# Patient Record
Sex: FEMALE | Race: WHITE | NOT HISPANIC OR LATINO | Employment: FULL TIME | URBAN - METROPOLITAN AREA
[De-identification: names, ages, dates, MRNs, and addresses within clinical notes are randomized per-mention and may not be internally consistent; named-entity substitution may affect disease eponyms.]

---

## 2017-02-20 ENCOUNTER — CONVERSION ENCOUNTER (OUTPATIENT)
Dept: GENERAL RADIOLOGY | Facility: HOSPITAL | Age: 52
End: 2017-02-20

## 2017-03-02 ENCOUNTER — CONVERSION ENCOUNTER (OUTPATIENT)
Dept: GENERAL RADIOLOGY | Facility: HOSPITAL | Age: 52
End: 2017-03-02

## 2018-02-09 ENCOUNTER — OFFICE VISIT CONVERTED (OUTPATIENT)
Dept: FAMILY MEDICINE CLINIC | Facility: CLINIC | Age: 53
End: 2018-02-09
Attending: PHYSICIAN ASSISTANT

## 2019-01-21 ENCOUNTER — HOSPITAL ENCOUNTER (OUTPATIENT)
Dept: GENERAL RADIOLOGY | Facility: HOSPITAL | Age: 54
Discharge: HOME | End: 2019-01-21
Attending: NURSE PRACTITIONER

## 2019-09-04 ENCOUNTER — OFFICE VISIT CONVERTED (OUTPATIENT)
Dept: FAMILY MEDICINE CLINIC | Facility: CLINIC | Age: 54
End: 2019-09-04
Attending: NURSE PRACTITIONER

## 2019-09-04 ENCOUNTER — HOSPITAL ENCOUNTER (OUTPATIENT)
Dept: FAMILY MEDICINE CLINIC | Facility: CLINIC | Age: 54
Discharge: HOME | End: 2019-09-04
Attending: NURSE PRACTITIONER

## 2019-09-07 LAB
AMOXICILLIN+CLAV SUSC ISLT: >=32
AMPICILLIN SUSC ISLT: >=32
AMPICILLIN+SULBAC SUSC ISLT: 16
BACTERIA UR CULT: ABNORMAL
CEFAZOLIN SUSC ISLT: 8
CEFEPIME SUSC ISLT: <=1
CEFTAZIDIME SUSC ISLT: <=1
CEFTRIAXONE SUSC ISLT: <=1
CEFUROXIME ORAL SUSC ISLT: 16
CEFUROXIME PARENTER SUSC ISLT: 16
CIPROFLOXACIN SUSC ISLT: <=0.25
ERTAPENEM SUSC ISLT: <=0.5
GENTAMICIN SUSC ISLT: <=1
LEVOFLOXACIN SUSC ISLT: <=0.12
NITROFURANTOIN SUSC ISLT: <=16
TETRACYCLINE SUSC ISLT: <=1
TMP SMX SUSC ISLT: <=20
TOBRAMYCIN SUSC ISLT: <=1

## 2019-09-24 ENCOUNTER — OFFICE VISIT CONVERTED (OUTPATIENT)
Dept: SURGERY | Facility: CLINIC | Age: 54
End: 2019-09-24
Attending: NURSE PRACTITIONER

## 2019-09-26 ENCOUNTER — OFFICE VISIT CONVERTED (OUTPATIENT)
Dept: FAMILY MEDICINE CLINIC | Facility: CLINIC | Age: 54
End: 2019-09-26
Attending: NURSE PRACTITIONER

## 2020-01-15 ENCOUNTER — HOSPITAL ENCOUNTER (OUTPATIENT)
Dept: GASTROENTEROLOGY | Facility: HOSPITAL | Age: 55
Setting detail: HOSPITAL OUTPATIENT SURGERY
Discharge: HOME | End: 2020-01-15
Attending: SURGERY

## 2020-01-15 LAB — HCG UR QL: NEGATIVE

## 2020-11-23 ENCOUNTER — TELEMEDICINE CONVERTED (OUTPATIENT)
Dept: FAMILY MEDICINE CLINIC | Facility: CLINIC | Age: 55
End: 2020-11-23
Attending: NURSE PRACTITIONER

## 2021-01-08 ENCOUNTER — HOSPITAL ENCOUNTER (OUTPATIENT)
Dept: OTHER | Facility: HOSPITAL | Age: 56
Discharge: HOME | End: 2021-01-08
Attending: INTERNAL MEDICINE

## 2021-02-01 ENCOUNTER — HOSPITAL ENCOUNTER (OUTPATIENT)
Dept: VACCINE CLINIC | Facility: HOSPITAL | Age: 56
Discharge: HOME | End: 2021-02-01
Attending: INTERNAL MEDICINE

## 2021-05-13 NOTE — PROGRESS NOTES
Progress Note      Patient Name: Rosmery Alvarez   Patient ID: [id number]   Sex: Female   YOB: 1965    Primary Care Provider: Bridgette GROSS   Referring Provider: Bridgette GROSS    Visit Date: November 23, 2020    Provider: GINNY Figueredo   Location: [facility]   Location Address: [address] [place] [address], [address]  [place], [place]  [id number]   Location Phone: [phone]          Chief Complaint  · possible UTI      History Of Present Illness  Video Conferencing Visit  Rosmery Alvarez is a 55 year old /White female who is presenting for evaluation via video conferencing via [employer]. Verbal consent obtained before beginning visit.   The following staff were present during this visit: GINNY Figueredo   Rosmery Alvarez is a 55 year old /White female who presents for evaluation and treatment of:      Pt has c/o possible UTI. Pt has had s/s of urinary frequency, urgency, and burning. Pt has been taking AZO with no relief.  Symptoms started 2 days ago.       Past Medical History  Disease Name Date Onset Notes   Pap smear for cervical cancer screening 04/2019 [facility]-normal   Screening Mammogram 01/12/2019 [employer]- normal         Past Surgical History  Procedure Name Date Notes   *No Past Surgical History --  --          Medication List  Name Date Started Instructions   Advil oral  --    Birth control patch  --    Maxalt 10 mg oral tablet 09/26/2019 take 1 tablet (10 mg) at onset of HA, may repeat at 2hr intervals; max 30 mg in 24 hours   Zyrtec 10 mg oral tablet  take 1 tablet (10 mg) by oral route once daily for 30 days         Allergy List  Allergen Name Date Reaction Notes   NO KNOWN DRUG ALLERGIES --  --  --          Family Medical History  Disease Name Relative/Age Notes   *Non Contributory  --          Reproductive History  Menstrual   Age Menarche: 15   Pregnancy Summary   Total Pregnancies: 1 Full Term: 1 Premature: 0   Ab Induced: 0 Ab  Spontaneous: 0 Ectopics: 0   Multiples: 0 Livin         Social History  Finding Status Start/Stop Quantity Notes   Active but no formal exercise --  --/-- --  --    Alcohol Light --/-- --  --     --  --/-- --  --    No known infection risk --  --/-- --  --    Tobacco Never --/-- --  --          Review of Systems  · Constitutional  o Denies  o : fever, fatigue, weight loss, weight gain  · Cardiovascular  o Denies  o : lower extremity edema, claudication, chest pressure, palpitations  · Respiratory  o Denies  o : shortness of breath, wheezing, cough, hemoptysis, dyspnea on exertion  · Gastrointestinal  o Denies  o : nausea, vomiting, diarrhea, constipation, abdominal pain  · Genitourinary  o Admits  o : urgency, frequency, dysuria      Physical Examination  · Constitutional  o Appearance  o : well-nourished, well developed, alert, in no acute distress  · Neurologic  o Mental Status Examination  o :   § Orientation  § : grossly oriented to person, place and time  · Psychiatric  o Mood and Affect  o : mood normal, affect appropriate          Assessment  · Urinary tract infection     599.0/N39.0  · Urinary frequency     788.41/R35.0  · Urinary urgency     788.63/R39.15  · Dysuria     788.1/R30.0      Plan  · Orders  o ACO-39: Current medications updated and reviewed (, 1159F) - - 2020  · Medications  o Macrobid 100 mg oral capsule   SIG: take 1 capsule (100 mg) by oral route every 12 hours with food for 7 days   DISP: (14) Capsule with 0 refills  Prescribed on 2020     o Medications have been Reconciled  o Transition of Care or Provider Policy  · Instructions  o Increase fluid intake. If you develop fever, chills, N/V, flank pain, or worsening of your symptoms return to the office or go to the ER.  o Patient was educated/instructed on their diagnosis, treatment and medications prior to discharge from the clinic today.  o Call the office with any concerns or questions.  o if symptoms worsen or  persist pt to come in for a UA and culture  · Disposition  o Call or Return if symptoms worsen or persist.            Electronically Signed by: GINNY Figueredo -Author on November 23, 2020 02:01:52 PM

## 2021-05-15 VITALS
HEART RATE: 72 BPM | HEIGHT: 64 IN | BODY MASS INDEX: 22.94 KG/M2 | OXYGEN SATURATION: 100 % | DIASTOLIC BLOOD PRESSURE: 70 MMHG | WEIGHT: 134.37 LBS | SYSTOLIC BLOOD PRESSURE: 122 MMHG

## 2021-05-15 VITALS — HEIGHT: 64 IN | BODY MASS INDEX: 23.05 KG/M2 | HEART RATE: 70 BPM | WEIGHT: 135 LBS

## 2021-05-15 VITALS
HEIGHT: 64 IN | BODY MASS INDEX: 23.13 KG/M2 | OXYGEN SATURATION: 100 % | HEART RATE: 75 BPM | DIASTOLIC BLOOD PRESSURE: 70 MMHG | WEIGHT: 135.5 LBS | SYSTOLIC BLOOD PRESSURE: 128 MMHG

## 2021-05-16 VITALS
DIASTOLIC BLOOD PRESSURE: 57 MMHG | HEART RATE: 68 BPM | OXYGEN SATURATION: 100 % | SYSTOLIC BLOOD PRESSURE: 137 MMHG | HEIGHT: 64 IN | BODY MASS INDEX: 22.75 KG/M2 | WEIGHT: 133.25 LBS | TEMPERATURE: 97.4 F

## 2021-07-08 ENCOUNTER — LAB (OUTPATIENT)
Dept: FAMILY MEDICINE CLINIC | Facility: CLINIC | Age: 56
End: 2021-07-08

## 2021-07-08 ENCOUNTER — TELEPHONE (OUTPATIENT)
Dept: FAMILY MEDICINE CLINIC | Facility: CLINIC | Age: 56
End: 2021-07-08

## 2021-07-08 DIAGNOSIS — N39.0 URINARY TRACT INFECTION WITHOUT HEMATURIA, SITE UNSPECIFIED: Primary | ICD-10-CM

## 2021-07-08 DIAGNOSIS — R30.0 DYSURIA: ICD-10-CM

## 2021-07-08 PROBLEM — Z12.4 PAP SMEAR FOR CERVICAL CANCER SCREENING: Status: ACTIVE | Noted: 2019-04-01

## 2021-07-08 LAB
BILIRUB BLD-MCNC: NEGATIVE MG/DL
CLARITY, POC: CLEAR
COLOR UR: YELLOW
GLUCOSE UR STRIP-MCNC: NEGATIVE MG/DL
KETONES UR QL: NEGATIVE
LEUKOCYTE EST, POC: ABNORMAL
NITRITE UR-MCNC: NEGATIVE MG/ML
PH UR: 6 [PH] (ref 5–8)
PROT UR STRIP-MCNC: NEGATIVE MG/DL
RBC # UR STRIP: ABNORMAL /UL
SP GR UR: 1.01 (ref 1–1.03)
UROBILINOGEN UR QL: NORMAL

## 2021-07-08 PROCEDURE — 81003 URINALYSIS AUTO W/O SCOPE: CPT | Performed by: NURSE PRACTITIONER

## 2021-07-08 PROCEDURE — 87086 URINE CULTURE/COLONY COUNT: CPT | Performed by: NURSE PRACTITIONER

## 2021-07-08 RX ORDER — OMEGA-3 FATTY ACIDS/FISH OIL 300-1000MG
CAPSULE ORAL
COMMUNITY
End: 2021-07-08 | Stop reason: SDUPTHER

## 2021-07-08 RX ORDER — NORELGESTROMIN AND ETHINYL ESTRADIOL 150; 35 UG/D; UG/D
PATCH TRANSDERMAL
COMMUNITY
Start: 2021-04-26

## 2021-07-08 RX ORDER — CETIRIZINE HYDROCHLORIDE 10 MG/1
TABLET ORAL
COMMUNITY

## 2021-07-08 RX ORDER — IBUPROFEN 400 MG/1
TABLET ORAL
COMMUNITY

## 2021-07-08 NOTE — TELEPHONE ENCOUNTER
PATIENT HAS CALLED REQUESTING AN ANTI BIOTIC, PATIENT STATED SHE BELIEVES SHE HAS A UTI.     PATIENT'S CALL BACK: [phone]    [facility]/pharmacy #[id number] - [place] - [address] - [phone]  - [phone]   [phone]

## 2021-07-10 LAB — BACTERIA SPEC AEROBE CULT: NO GROWTH

## 2021-07-12 ENCOUNTER — TELEPHONE (OUTPATIENT)
Dept: FAMILY MEDICINE CLINIC | Facility: CLINIC | Age: 56
End: 2021-07-12

## 2021-09-20 ENCOUNTER — OFFICE VISIT (OUTPATIENT)
Dept: FAMILY MEDICINE CLINIC | Facility: CLINIC | Age: 56
End: 2021-09-20

## 2021-09-20 VITALS
BODY MASS INDEX: 23.56 KG/M2 | OXYGEN SATURATION: 100 % | HEART RATE: 67 BPM | TEMPERATURE: 98.2 F | SYSTOLIC BLOOD PRESSURE: 124 MMHG | HEIGHT: 64 IN | DIASTOLIC BLOOD PRESSURE: 50 MMHG | WEIGHT: 138 LBS

## 2021-09-20 DIAGNOSIS — R35.0 URINARY FREQUENCY: ICD-10-CM

## 2021-09-20 DIAGNOSIS — R39.15 URINARY URGENCY: ICD-10-CM

## 2021-09-20 DIAGNOSIS — R30.0 DYSURIA: ICD-10-CM

## 2021-09-20 DIAGNOSIS — R31.9 HEMATURIA, UNSPECIFIED TYPE: ICD-10-CM

## 2021-09-20 DIAGNOSIS — N39.0 URINARY TRACT INFECTION WITHOUT HEMATURIA, SITE UNSPECIFIED: Primary | ICD-10-CM

## 2021-09-20 LAB
BILIRUB BLD-MCNC: NEGATIVE MG/DL
CLARITY, POC: CLEAR
COLOR UR: ABNORMAL
GLUCOSE UR STRIP-MCNC: ABNORMAL MG/DL
KETONES UR QL: NEGATIVE
LEUKOCYTE EST, POC: ABNORMAL
NITRITE UR-MCNC: POSITIVE MG/ML
PH UR: 5 [PH] (ref 5–8)
PROT UR STRIP-MCNC: ABNORMAL MG/DL
RBC # UR STRIP: ABNORMAL /UL
SP GR UR: 1.02 (ref 1–1.03)
UROBILINOGEN UR QL: NORMAL

## 2021-09-20 PROCEDURE — 99213 OFFICE O/P EST LOW 20 MIN: CPT | Performed by: NURSE PRACTITIONER

## 2021-09-20 PROCEDURE — 87086 URINE CULTURE/COLONY COUNT: CPT | Performed by: NURSE PRACTITIONER

## 2021-09-20 PROCEDURE — 81003 URINALYSIS AUTO W/O SCOPE: CPT | Performed by: NURSE PRACTITIONER

## 2021-09-20 RX ORDER — NITROFURANTOIN 25; 75 MG/1; MG/1
100 CAPSULE ORAL 2 TIMES DAILY
Qty: 14 CAPSULE | Refills: 0 | Status: SHIPPED | OUTPATIENT
Start: 2021-09-20

## 2021-09-20 RX ORDER — PHENAZOPYRIDINE HYDROCHLORIDE 200 MG/1
200 TABLET, FILM COATED ORAL 3 TIMES DAILY PRN
Qty: 9 TABLET | Refills: 0 | Status: SHIPPED | OUTPATIENT
Start: 2021-09-20

## 2021-09-20 NOTE — PROGRESS NOTES
"Chief Complaint  Urinary Tract Infection    Subjective          Rosmery Alvarez presents to [facility]  Urinary Tract Infection   This is a new problem. The current episode started yesterday. The problem has been unchanged. The pain is at a severity of 2/10. There has been no fever. Associated symptoms include frequency, nausea and urgency. She has tried increased fluids (azo and cranberry juice) for the symptoms. Her past medical history is significant for recurrent UTIs.       Patient complaining of dysuria, urinary frequency, urinary urgency, abdominal pressure since yesterday.  Patient took Azo this morning with some improvement of symptoms.      History reviewed. No pertinent past medical history.      No Known Allergies       History reviewed. No pertinent surgical history.       Social History     Tobacco Use   • Smoking status: Never Smoker   • Smokeless tobacco: Never Used   Substance Use Topics   • Alcohol use: Never         History reviewed. No pertinent family history.       Current Outpatient Medications on File Prior to Visit   Medication Sig   • cetirizine (ZyrTEC Allergy) 10 MG tablet Zyrtec 10 mg oral tablet take 1 tablet (10 mg) by oral route once daily for 30 days   Active   • ibuprofen (ADVIL,MOTRIN) 400 MG tablet ibuprofen 400 mg oral tablet take 1 tablet (400 mg) by oral route 3 times per day with food for 7 days   Suspended   • Xulane 150-35 MCG/24HR APPLY 1 PATH EVERY WEEK, SKIP PATCH FREE WEEK     No current facility-administered medications on file prior to visit.         Immunization History   Administered Date(s) Administered   • COVID-19 (MODERNA) 01/08/2021, 02/01/2021         /50 (BP Location: Left arm, Patient Position: Sitting)   Pulse 67   Temp 98.2 °F (36.8 °C) (Oral)   Ht 162.6 cm (64\")   Wt 62.6 kg (138 lb)   SpO2 100%   BMI 23.69 kg/m²             Physical Exam  Vitals reviewed.   Constitutional:       Appearance: Normal appearance. " She is well-developed.   HENT:      Head: Normocephalic and atraumatic.      Right Ear: External ear normal.      Left Ear: External ear normal.      Mouth/Throat:      Pharynx: No oropharyngeal exudate.   Eyes:      Conjunctiva/sclera: Conjunctivae normal.      Pupils: Pupils are equal, round, and reactive to light.   Cardiovascular:      Rate and Rhythm: Normal rate and regular rhythm.      Heart sounds: No murmur heard.   No friction rub. No gallop.    Pulmonary:      Effort: Pulmonary effort is normal.      Breath sounds: Normal breath sounds. No wheezing or rhonchi.   Abdominal:      General: Abdomen is flat. Bowel sounds are normal.      Palpations: Abdomen is soft.   Skin:     General: Skin is warm and dry.   Neurological:      Mental Status: She is alert and oriented to person, place, and time.      Cranial Nerves: No cranial nerve deficit.   Psychiatric:         Mood and Affect: Mood and affect normal.         Behavior: Behavior normal.         Thought Content: Thought content normal.         Judgment: Judgment normal.             Result Review :     The following data was reviewed by: GINNY Carbajal on 09/20/2021:  Contains abnormal data POCT urinalysis dipstick, automated  Order: [id number]  Status:  Final result   Visible to patient:  No (not released) Next appt:  None Dx:  Dysuria; Urinary frequency; Urinary u...  Specimen Information: Urine         0 Result Notes  Component   Ref Range & Units 13:57 2 mo ago   Color   Yellow, Straw, Dark Yellow, Lavern OrangeAbnormal   Yellow    Clarity, UA   Clear Clear  Clear    Specific Gravity    1.005 - 1.030 1.025  1.015    pH, Urine   5.0 - 8.0 5.0  6.0    Leukocytes   Negative Small (1+)Abnormal   TraceAbnormal     Nitrite, UA   Negative PositiveAbnormal   Negative    Protein, POC   Negative mg/dL 30 mg/dLAbnormal   Negative    Glucose, UA   Negative, 1000 mg/dL (3+) mg/dL 100 mg/dLAbnormal   Negative    Ketones, UA   Negative Negative  Negative     Urobilinogen, UA   Normal Normal  Normal    Bilirubin   Negative Negative  Negative    Blood, UA   Negative SmallAbnormal   TraceAbnormal     Resulting Agency [facility]         Specimen Collected: 09/20/21 13:57 Last Resulted: 09/20/21 13:57        Lab Flowsheet     Order Details     View Encounter     Lab and Collection Details     Routing     Result                                Assessment and Plan      Diagnoses and all orders for this visit:    1. Urinary tract infection without hematuria, site unspecified (Primary)  -     POCT urinalysis dipstick, automated  -     Urine Culture - Urine, Urine, Clean Catch  -     nitrofurantoin, macrocrystal-monohydrate, (Macrobid) 100 MG capsule; Take 1 capsule by mouth 2 (Two) Times a Day.  Dispense: 14 capsule; Refill: 0  -     phenazopyridine (Pyridium) 200 MG tablet; Take 1 tablet by mouth 3 (Three) Times a Day As Needed for Bladder Spasms.  Dispense: 9 tablet; Refill: 0    2. Dysuria    3. Urinary frequency    4. Urinary urgency    5. Hematuria, unspecified type              Follow Up     Return if symptoms worsen or fail to improve.    Patient was given instructions and counseling regarding her condition or for health maintenance advice. Please see specific information pulled into the AVS if appropriate.

## 2021-09-21 ENCOUNTER — TELEPHONE (OUTPATIENT)
Dept: FAMILY MEDICINE CLINIC | Facility: CLINIC | Age: 56
End: 2021-09-21

## 2021-09-21 LAB — BACTERIA SPEC AEROBE CULT: NO GROWTH

## 2021-09-30 ENCOUNTER — TRANSCRIBE ORDERS (OUTPATIENT)
Dept: ADMINISTRATIVE | Facility: HOSPITAL | Age: 56
End: 2021-09-30

## 2021-09-30 DIAGNOSIS — Z12.31 VISIT FOR SCREENING MAMMOGRAM: Primary | ICD-10-CM

## 2021-12-22 ENCOUNTER — HOSPITAL ENCOUNTER (OUTPATIENT)
Dept: MAMMOGRAPHY | Facility: HOSPITAL | Age: 56
Discharge: HOME | End: 2021-12-22
Admitting: NURSE PRACTITIONER

## 2021-12-22 DIAGNOSIS — Z12.31 VISIT FOR SCREENING MAMMOGRAM: ICD-10-CM

## 2021-12-22 PROCEDURE — 77063 BREAST TOMOSYNTHESIS BI: CPT

## 2021-12-22 PROCEDURE — 77067 SCR MAMMO BI INCL CAD: CPT

## 2022-11-07 ENCOUNTER — TRANSCRIBE ORDERS (OUTPATIENT)
Dept: ADMINISTRATIVE | Facility: HOSPITAL | Age: 57
End: 2022-11-07

## 2022-11-07 DIAGNOSIS — Z12.31 ENCOUNTER FOR SCREENING MAMMOGRAM FOR MALIGNANT NEOPLASM OF BREAST: Primary | ICD-10-CM

## 2023-02-09 ENCOUNTER — HOSPITAL ENCOUNTER (OUTPATIENT)
Dept: MAMMOGRAPHY | Facility: HOSPITAL | Age: 58
Discharge: HOME | End: 2023-02-09
Admitting: OBSTETRICS & GYNECOLOGY
Payer: COMMERCIAL

## 2023-02-09 DIAGNOSIS — Z12.31 ENCOUNTER FOR SCREENING MAMMOGRAM FOR MALIGNANT NEOPLASM OF BREAST: ICD-10-CM

## 2023-02-09 PROCEDURE — 77063 BREAST TOMOSYNTHESIS BI: CPT

## 2023-02-09 PROCEDURE — 77067 SCR MAMMO BI INCL CAD: CPT

## 2024-03-22 ENCOUNTER — TRANSCRIBE ORDERS (OUTPATIENT)
Dept: ADMINISTRATIVE | Facility: HOSPITAL | Age: 59
End: 2024-03-22
Payer: COMMERCIAL

## 2024-03-22 DIAGNOSIS — Z12.31 SCREENING MAMMOGRAM FOR BREAST CANCER: Primary | ICD-10-CM

## 2024-05-23 ENCOUNTER — HOSPITAL ENCOUNTER (OUTPATIENT)
Dept: MAMMOGRAPHY | Facility: HOSPITAL | Age: 59
Discharge: HOME | End: 2024-05-23
Admitting: OBSTETRICS & GYNECOLOGY
Payer: COMMERCIAL

## 2024-05-23 DIAGNOSIS — Z12.31 SCREENING MAMMOGRAM FOR BREAST CANCER: ICD-10-CM

## 2024-05-23 PROCEDURE — 77067 SCR MAMMO BI INCL CAD: CPT

## 2024-05-23 PROCEDURE — 77063 BREAST TOMOSYNTHESIS BI: CPT

## 2025-02-11 ENCOUNTER — OFFICE VISIT (OUTPATIENT)
Dept: FAMILY MEDICINE CLINIC | Facility: CLINIC | Age: 60
End: 2025-02-11
Payer: COMMERCIAL

## 2025-02-11 VITALS
BODY MASS INDEX: 23.05 KG/M2 | DIASTOLIC BLOOD PRESSURE: 74 MMHG | HEART RATE: 64 BPM | TEMPERATURE: 97.6 F | HEIGHT: 64 IN | OXYGEN SATURATION: 100 % | SYSTOLIC BLOOD PRESSURE: 136 MMHG | WEIGHT: 135 LBS

## 2025-02-11 DIAGNOSIS — Z13.220 SCREENING FOR CHOLESTEROL LEVEL: ICD-10-CM

## 2025-02-11 DIAGNOSIS — Z00.00 ANNUAL PHYSICAL EXAM: Primary | ICD-10-CM

## 2025-02-11 DIAGNOSIS — J02.9 PHARYNGITIS, UNSPECIFIED ETIOLOGY: ICD-10-CM

## 2025-02-11 DIAGNOSIS — J02.9 SORE THROAT: ICD-10-CM

## 2025-02-11 DIAGNOSIS — B37.31 VAGINAL YEAST INFECTION: ICD-10-CM

## 2025-02-11 DIAGNOSIS — Z13.29 SCREENING FOR THYROID DISORDER: ICD-10-CM

## 2025-02-11 DIAGNOSIS — Z11.59 NEED FOR HEPATITIS C SCREENING TEST: ICD-10-CM

## 2025-02-11 DIAGNOSIS — R05.1 ACUTE COUGH: ICD-10-CM

## 2025-02-11 LAB
EXPIRATION DATE: NORMAL
EXPIRATION DATE: NORMAL
FLUAV AG UPPER RESP QL IA.RAPID: NOT DETECTED
FLUBV AG UPPER RESP QL IA.RAPID: NOT DETECTED
INTERNAL CONTROL: NORMAL
INTERNAL CONTROL: NORMAL
Lab: NORMAL
Lab: NORMAL
S PYO AG THROAT QL: NEGATIVE
SARS-COV-2 AG UPPER RESP QL IA.RAPID: NOT DETECTED

## 2025-02-11 PROCEDURE — 87880 STREP A ASSAY W/OPTIC: CPT | Performed by: NURSE PRACTITIONER

## 2025-02-11 PROCEDURE — 87205 SMEAR GRAM STAIN: CPT | Performed by: NURSE PRACTITIONER

## 2025-02-11 PROCEDURE — 99396 PREV VISIT EST AGE 40-64: CPT | Performed by: NURSE PRACTITIONER

## 2025-02-11 PROCEDURE — 87070 CULTURE OTHR SPECIMN AEROBIC: CPT | Performed by: NURSE PRACTITIONER

## 2025-02-11 PROCEDURE — 99213 OFFICE O/P EST LOW 20 MIN: CPT | Performed by: NURSE PRACTITIONER

## 2025-02-11 PROCEDURE — 87428 SARSCOV & INF VIR A&B AG IA: CPT | Performed by: NURSE PRACTITIONER

## 2025-02-11 RX ORDER — FLUCONAZOLE 150 MG/1
TABLET ORAL
Qty: 2 TABLET | Refills: 0 | Status: SHIPPED | OUTPATIENT
Start: 2025-02-11

## 2025-02-11 NOTE — PROGRESS NOTES
Chief Complaint  Annual Exam, Sore Throat (/), and Cough    Subjective            Rosmery Alvarez presents to [facility]  History of Present Illness  Pt is here  to re-establish care. Pt is due an annual CPE as well. Pt has c/o productive cough and sore throat. Pt states this has been present for approximately 2 days. Pt was seen at  on 1/4/25 and was given ABX, steroid pack, and cough medication. Pt states the cough has lingered. Pt states the sore throat is a new symptom. Pt would like to be tested for flu and covid and strep. She requests a diflucan with any antibiotics prescribed.     Pt is due labs/orders placed.    Pap: hysterectomy    Mammogram: 5/23/24    Colon: 1/15/2020, repeat in 10 yrs.     Pt is due pcv20, tdap, and shingles vaccines.     Pt is followed by Dr. Wood and Dr. Lewsi with GYN.            History reviewed. No pertinent past medical history.    No Known Allergies     Past Surgical History:   Procedure Laterality Date    HYSTERECTOMY  10/20/2023        Social History     Tobacco Use    Smoking status: Never    Smokeless tobacco: Never   Substance Use Topics    Alcohol use: Yes     Comment: occ       History reviewed. No pertinent family history.     Current Outpatient Medications on File Prior to Visit   Medication Sig    cetirizine (ZyrTEC Allergy) 10 MG tablet Zyrtec 10 mg oral tablet take 1 tablet (10 mg) by oral route once daily for 30 days   Active    estradiol (CLIMARA) 0.0375 MG/24HR PLACE 1 PATCH ON THE SKIN 1 TIME PER WEEK.    ibuprofen (ADVIL,MOTRIN) 400 MG tablet ibuprofen 400 mg oral tablet take 1 tablet (400 mg) by oral route 3 times per day with food for 7 days   Suspended    [DISCONTINUED] azithromycin (Zithromax Z-Sea) 250 MG tablet Take 2 tablets by mouth on day 1, then 1 tablet daily on days 2-5 (Patient not taking: Reported on 2/11/2025)    [DISCONTINUED] methylPREDNISolone (MEDROL) 4 MG dose pack Take as directed on package instructions.  "(Patient not taking: Reported on 2/11/2025)     No current facility-administered medications on file prior to visit.       Health Maintenance Due   Topic Date Due    HEPATITIS C SCREENING  Never done    ANNUAL PHYSICAL  Never done       Objective     /74   Pulse 64   Temp 97.6 °F (36.4 °C)   Ht 162.6 cm (64\")   Wt 61.2 kg (135 lb)   SpO2 100%   BMI 23.17 kg/m²       Physical Exam  Constitutional:       General: She is not in acute distress.     Appearance: Normal appearance. She is not ill-appearing.   HENT:      Head: Normocephalic and atraumatic.      Right Ear: Tympanic membrane, ear canal and external ear normal.      Left Ear: Tympanic membrane, ear canal and external ear normal.      Nose: Nose normal.      Mouth/Throat:      Lips: Pink.      Mouth: Mucous membranes are moist.      Pharynx: Posterior oropharyngeal erythema present. No pharyngeal swelling, oropharyngeal exudate, uvula swelling or postnasal drip.      Tonsils: Tonsillar exudate present. No tonsillar abscesses.   Cardiovascular:      Rate and Rhythm: Normal rate and regular rhythm.      Heart sounds: Normal heart sounds. No murmur heard.  Pulmonary:      Effort: Pulmonary effort is normal. No respiratory distress.      Breath sounds: Normal breath sounds.   Chest:      Chest wall: No tenderness.   Abdominal:      General: Abdomen is flat. Bowel sounds are normal. There is no distension.      Palpations: Abdomen is soft. There is no mass.      Tenderness: There is no abdominal tenderness. There is no guarding.   Musculoskeletal:         General: No swelling or tenderness. Normal range of motion.      Cervical back: Normal range of motion and neck supple.   Skin:     General: Skin is warm and dry.      Findings: No rash.   Neurological:      General: No focal deficit present.      Mental Status: She is alert and oriented to person, place, and time. Mental status is at baseline.      Gait: Gait normal.   Psychiatric:         Mood and " Affect: Mood normal.         Behavior: Behavior normal.         Thought Content: Thought content normal.         Judgment: Judgment normal.           Result Review :                           Assessment and Plan        Diagnoses and all orders for this visit:    1. Annual physical exam (Primary)  -     CBC w AUTO Differential; Future  -     Comprehensive metabolic panel; Future  -     Lipid panel; Future  -     TSH; Future  -     Hepatitis C antibody; Future    2. Sore throat  -     POCT SARS-CoV-2 Antigen ASMRA + Flu  -     POCT rapid strep A  -     amoxicillin-clavulanate (AUGMENTIN) 875-125 MG per tablet; Take 1 tablet by mouth 2 (Two) Times a Day for 10 days.  Dispense: 20 tablet; Refill: 0  -     Throat / Upper Respiratory Culture - Swab, Throat; Future  -     Throat / Upper Respiratory Culture - Swab, Throat    3. Need for hepatitis C screening test  -     Hepatitis C antibody; Future    4. Screening for thyroid disorder  -     TSH; Future    5. Screening for cholesterol level  -     Comprehensive metabolic panel; Future  -     Lipid panel; Future    6. Acute cough  -     POCT SARS-CoV-2 Antigen SAMRA + Flu  -     amoxicillin-clavulanate (AUGMENTIN) 875-125 MG per tablet; Take 1 tablet by mouth 2 (Two) Times a Day for 10 days.  Dispense: 20 tablet; Refill: 0    7. Vaginal yeast infection  -     fluconazole (Diflucan) 150 MG tablet; Take one now may repeat in 7 days  Dispense: 2 tablet; Refill: 0    8. Pharyngitis, unspecified etiology  -     amoxicillin-clavulanate (AUGMENTIN) 875-125 MG per tablet; Take 1 tablet by mouth 2 (Two) Times a Day for 10 days.  Dispense: 20 tablet; Refill: 0  -     Throat / Upper Respiratory Culture - Swab, Throat; Future  -     Throat / Upper Respiratory Culture - Swab, Throat      Preventative Counseling:  Healthy diet  Daily exercise  Get adequate sleep        Follow Up     Return in about 1 year (around 2/11/2026), or if symptoms worsen or fail to improve, for Annual  physical.    Patient was given instructions and counseling regarding her condition or for health maintenance advice. Please see specific information pulled into the AVS if appropriate.     Rosmery Alvarez  reports that she has never smoked. She has never used smokeless tobacco.

## 2025-02-14 ENCOUNTER — TELEPHONE (OUTPATIENT)
Dept: FAMILY MEDICINE CLINIC | Facility: CLINIC | Age: 60
End: 2025-02-14
Payer: COMMERCIAL

## 2025-02-14 LAB
BACTERIA SPEC RESP CULT: NORMAL
BACTERIA SPEC RESP CULT: NORMAL

## 2025-02-14 NOTE — TELEPHONE ENCOUNTER
Name: Rosmery Alvarez    Relationship: Self    Best Callback Number: [phone]     HUB PROVIDED THE RELAY MESSAGE FROM THE OFFICE   PATIENT VOICED UNDERSTANDING AND HAS NO FURTHER QUESTIONS AT THIS TIME    ADDITIONAL INFORMATION:

## 2025-07-28 ENCOUNTER — TRANSCRIBE ORDERS (OUTPATIENT)
Dept: ULTRASOUND IMAGING | Facility: HOSPITAL | Age: 60
End: 2025-07-28
Payer: COMMERCIAL

## 2025-07-28 DIAGNOSIS — Z12.31 SCREENING MAMMOGRAM, ENCOUNTER FOR: Primary | ICD-10-CM

## 2025-08-04 ENCOUNTER — HOSPITAL ENCOUNTER (OUTPATIENT)
Dept: MAMMOGRAPHY | Facility: HOSPITAL | Age: 60
Discharge: HOME | End: 2025-08-04
Admitting: NURSE PRACTITIONER
Payer: COMMERCIAL

## 2025-08-04 DIAGNOSIS — Z12.31 SCREENING MAMMOGRAM, ENCOUNTER FOR: ICD-10-CM

## 2025-08-04 PROCEDURE — 77067 SCR MAMMO BI INCL CAD: CPT

## 2025-08-04 PROCEDURE — 77063 BREAST TOMOSYNTHESIS BI: CPT
